# Patient Record
Sex: FEMALE | Race: WHITE
[De-identification: names, ages, dates, MRNs, and addresses within clinical notes are randomized per-mention and may not be internally consistent; named-entity substitution may affect disease eponyms.]

---

## 2019-10-06 ENCOUNTER — HOSPITAL ENCOUNTER (EMERGENCY)
Dept: HOSPITAL 35 - ER | Age: 19
Discharge: HOME | End: 2019-10-06
Payer: COMMERCIAL

## 2019-10-06 VITALS — HEIGHT: 70 IN | WEIGHT: 155.01 LBS | BODY MASS INDEX: 22.19 KG/M2

## 2019-10-06 VITALS — DIASTOLIC BLOOD PRESSURE: 61 MMHG | SYSTOLIC BLOOD PRESSURE: 116 MMHG

## 2019-10-06 DIAGNOSIS — G89.29: Primary | ICD-10-CM

## 2019-10-06 DIAGNOSIS — Z88.0: ICD-10-CM

## 2019-10-06 DIAGNOSIS — R07.89: ICD-10-CM

## 2019-10-06 NOTE — EKG
[address]
[place]  [address]
Phone:  [phone]                    ELECTROCARDIOGRAM REPORT      
_______________________________________________________________________________
 
Name:       VIC KELLEY               [facility]                     [id number]     Account #:      [id number]  
Admission:  10/06/19    Attend Phys:                          
Discharge:  10/06/19    Date of Birth:  [birth year]  
                                                          Report #: [id number]
   [id number]
_______________________________________________________________________________
THIS REPORT FOR:   //name//                          
 
                         [facility] ED
                                       
Test Date:    2019-10-06               Test Time:    05:15:04
Pat Name:     VIC KELLEY               Department:   
Patient ID:   [id number]            Room:          
Gender:       F                        Technician:   DENITA
:          2000               Requested By: Nelson Fraire
Order Number: [id number] MD:   Robert Arias
                                 Measurements
Intervals                              Axis          
Rate:         67                       P:            88
KS:           153                      QRS:          91
QRSD:         97                       T:            11
QT:           399                                    
QTc:          422                                    
                           Interpretive Statements
Sinus rhythm
Right ventricular conduction delay
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
No previous ECG available for comparison
 
Electronically Signed On 10-6-2019 13:31:36 CDT by Robert Arias
[email or web address]
 
 
 
 
 
 
 
 
 
 
 
 
 
 
 
 
 
 
  <ELECTRONICALLY SIGNED>
   By: Robert Arias MD, [facility]   
  10/06/19     1331
D: 10/06/19 0515                           _____________________________________
T: 10/06/19 0515                           Robert Arias MD, FACC     /EPI